# Patient Record
Sex: FEMALE | Race: BLACK OR AFRICAN AMERICAN | NOT HISPANIC OR LATINO | Employment: FULL TIME | URBAN - METROPOLITAN AREA
[De-identification: names, ages, dates, MRNs, and addresses within clinical notes are randomized per-mention and may not be internally consistent; named-entity substitution may affect disease eponyms.]

---

## 2019-07-29 ENCOUNTER — HOSPITAL ENCOUNTER (EMERGENCY)
Facility: OTHER | Age: 64
Discharge: HOME | End: 2019-07-29
Attending: EMERGENCY MEDICINE
Payer: COMMERCIAL

## 2019-07-29 VITALS
RESPIRATION RATE: 20 BRPM | OXYGEN SATURATION: 97 % | HEIGHT: 64 IN | SYSTOLIC BLOOD PRESSURE: 177 MMHG | TEMPERATURE: 99 F | DIASTOLIC BLOOD PRESSURE: 85 MMHG | WEIGHT: 200 LBS | BODY MASS INDEX: 34.15 KG/M2 | HEART RATE: 72 BPM
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
## 2019-07-29 DIAGNOSIS — T59.811A SMOKE INHALATION: ICD-10-CM

## 2019-07-29 DIAGNOSIS — R03.0 ELEVATED BLOOD PRESSURE, SITUATIONAL: ICD-10-CM

## 2019-07-29 PROCEDURE — 25000003 PHARM REV CODE 250: Performed by: EMERGENCY MEDICINE

## 2019-07-29 PROCEDURE — 99283 EMERGENCY DEPT VISIT LOW MDM: CPT

## 2019-07-29 RX ORDER — VERAPAMIL HYDROCHLORIDE 120 MG/1
240 TABLET, FILM COATED, EXTENDED RELEASE ORAL
Status: COMPLETED | OUTPATIENT
Start: 2019-07-29 | End: 2019-07-29

## 2019-07-29 RX ORDER — VERAPAMIL HYDROCHLORIDE 240 MG/1
240 CAPSULE, EXTENDED RELEASE ORAL DAILY
COMMUNITY

## 2019-07-29 RX ADMIN — VERAPAMIL HYDROCHLORIDE 240 MG: 120 TABLET, FILM COATED, EXTENDED RELEASE ORAL at 09:07
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
## 2022-05-22 ENCOUNTER — IMMUNIZATION (OUTPATIENT)
Dept: PRIMARY CARE CLINIC | Facility: CLINIC | Age: 67
End: 2022-05-22

## 2022-05-22 DIAGNOSIS — Z23 NEED FOR VACCINATION: Primary | ICD-10-CM

## 2022-05-22 PROCEDURE — 91305 COVID-19, MRNA, LNP-S, PF, 30 MCG/0.3 ML DOSE VACCINE (PFIZER): CPT | Mod: PBBFAC | Performed by: INTERNAL MEDICINE

## 2023-10-03 ENCOUNTER — HOSPITAL ENCOUNTER (OUTPATIENT)
Dept: RADIOLOGY | Facility: OTHER | Age: 68
Discharge: HOME | End: 2023-10-03
Attending: INTERNAL MEDICINE
Payer: COMMERCIAL

## 2023-10-03 DIAGNOSIS — R07.9 CHEST PAIN, UNSPECIFIED: ICD-10-CM

## 2023-10-03 DIAGNOSIS — R07.9 CHEST PAIN, UNSPECIFIED: Primary | ICD-10-CM

## 2023-10-03 PROCEDURE — 71046 X-RAY EXAM CHEST 2 VIEWS: CPT | Mod: 26,,, | Performed by: RADIOLOGY

## 2023-10-03 PROCEDURE — 71046 XR CHEST PA AND LATERAL: ICD-10-PCS | Mod: 26,,, | Performed by: RADIOLOGY

## 2023-10-03 PROCEDURE — 71046 X-RAY EXAM CHEST 2 VIEWS: CPT | Mod: TC,FY
